# Patient Record
Sex: FEMALE | Race: WHITE | HISPANIC OR LATINO | URBAN - METROPOLITAN AREA
[De-identification: names, ages, dates, MRNs, and addresses within clinical notes are randomized per-mention and may not be internally consistent; named-entity substitution may affect disease eponyms.]

---

## 2021-10-27 ENCOUNTER — CLAIMS CREATED FROM THE CLAIM WINDOW (OUTPATIENT)
Dept: URBAN - METROPOLITAN AREA CLINIC 78 | Facility: CLINIC | Age: 41
End: 2021-10-27
Payer: COMMERCIAL

## 2021-10-27 ENCOUNTER — DASHBOARD ENCOUNTERS (OUTPATIENT)
Age: 41
End: 2021-10-27

## 2021-10-27 VITALS
DIASTOLIC BLOOD PRESSURE: 87 MMHG | HEART RATE: 85 BPM | BODY MASS INDEX: 52.5 KG/M2 | TEMPERATURE: 97.8 F | WEIGHT: 260.4 LBS | HEIGHT: 59 IN | SYSTOLIC BLOOD PRESSURE: 126 MMHG

## 2021-10-27 DIAGNOSIS — Z79.01 CURRENT USE OF LONG TERM ANTICOAGULATION: ICD-10-CM

## 2021-10-27 DIAGNOSIS — K62.5 RECTAL BLEEDING: ICD-10-CM

## 2021-10-27 PROBLEM — 711150003: Status: ACTIVE | Noted: 2021-10-27

## 2021-10-27 PROCEDURE — 99212 OFFICE O/P EST SF 10 MIN: CPT | Performed by: INTERNAL MEDICINE

## 2021-10-27 RX ORDER — OMEPRAZOLE 20 MG/1
TAKE ONE CAPSULE BY MOUTH DAILY BEFORE MEALS CAPSULE, DELAYED RELEASE ORAL
Qty: 90 | Refills: 4 | Status: ACTIVE | COMMUNITY
Start: 2019-04-15

## 2021-10-27 NOTE — PHYSICAL EXAM GASTROINTESTINAL
Abdomen , soft, nontender, nondistended , no guarding or rigidity , no masses palpable , normal bowel sounds Central obesity

## 2021-10-27 NOTE — HPI-TODAY'S VISIT:
Patient reports rectal bleeding x 2 weeks  It is not just when she is having BM , it happens otherwise  BM are alternating .. Milk makes her go , she known how to identify rectal bleed from sponteaneous bleed on underpants  Bleeding is mostly untriggered in her underpant  Patient is on Blood thinner Eliquis ( post CoVID )  Patient  is s/p hysterectomy  Patient is s/p colonoscopy in 2018 .No polyps or diverticulosis